# Patient Record
Sex: MALE | Race: WHITE | NOT HISPANIC OR LATINO | URBAN - METROPOLITAN AREA
[De-identification: names, ages, dates, MRNs, and addresses within clinical notes are randomized per-mention and may not be internally consistent; named-entity substitution may affect disease eponyms.]

---

## 2017-03-10 ENCOUNTER — RECORDS - HEALTHEAST (OUTPATIENT)
Dept: LAB | Facility: CLINIC | Age: 57
End: 2017-03-10

## 2017-03-10 LAB
CHOLEST SERPL-MCNC: 142 MG/DL
FASTING STATUS PATIENT QL REPORTED: ABNORMAL
HDLC SERPL-MCNC: 39 MG/DL
LDLC SERPL CALC-MCNC: 91 MG/DL
PSA SERPL-MCNC: 1.7 NG/ML (ref 0–3.5)
TRIGL SERPL-MCNC: 60 MG/DL

## 2018-04-20 ENCOUNTER — RECORDS - HEALTHEAST (OUTPATIENT)
Dept: LAB | Facility: CLINIC | Age: 58
End: 2018-04-20

## 2018-04-20 LAB
ANION GAP SERPL CALCULATED.3IONS-SCNC: 10 MMOL/L (ref 5–18)
BUN SERPL-MCNC: 14 MG/DL (ref 8–22)
CALCIUM SERPL-MCNC: 9.4 MG/DL (ref 8.5–10.5)
CHLORIDE BLD-SCNC: 109 MMOL/L (ref 98–107)
CHOLEST SERPL-MCNC: 146 MG/DL
CO2 SERPL-SCNC: 22 MMOL/L (ref 22–31)
CREAT SERPL-MCNC: 0.83 MG/DL (ref 0.7–1.3)
FASTING STATUS PATIENT QL REPORTED: NORMAL
GFR SERPL CREATININE-BSD FRML MDRD: >60 ML/MIN/1.73M2
GLUCOSE BLD-MCNC: 98 MG/DL (ref 70–125)
HDLC SERPL-MCNC: 40 MG/DL
LDLC SERPL CALC-MCNC: 92 MG/DL
POTASSIUM BLD-SCNC: 3.9 MMOL/L (ref 3.5–5)
PSA SERPL-MCNC: 1.4 NG/ML (ref 0–3.5)
SODIUM SERPL-SCNC: 141 MMOL/L (ref 136–145)
TRIGL SERPL-MCNC: 70 MG/DL

## 2018-10-05 ENCOUNTER — RECORDS - HEALTHEAST (OUTPATIENT)
Dept: LAB | Facility: CLINIC | Age: 58
End: 2018-10-05

## 2018-10-06 LAB — BACTERIA SPEC CULT: NO GROWTH

## 2018-10-22 ENCOUNTER — COMMUNICATION - HEALTHEAST (OUTPATIENT)
Dept: SCHEDULING | Facility: CLINIC | Age: 58
End: 2018-10-22

## 2018-10-22 ENCOUNTER — OFFICE VISIT - HEALTHEAST (OUTPATIENT)
Dept: UROLOGY | Facility: CLINIC | Age: 58
End: 2018-10-22

## 2018-10-22 DIAGNOSIS — N20.9 CALCULUS OF URINARY TRACT: ICD-10-CM

## 2018-10-22 DIAGNOSIS — N20.0 CALCULUS OF KIDNEY: ICD-10-CM

## 2018-10-22 DIAGNOSIS — N20.1 CALCULUS OF URETER: ICD-10-CM

## 2018-10-22 DIAGNOSIS — N13.2 HYDRONEPHROSIS WITH URINARY OBSTRUCTION DUE TO URETERAL CALCULUS: ICD-10-CM

## 2018-10-22 LAB
ALBUMIN UR-MCNC: NEGATIVE MG/DL
APPEARANCE UR: CLEAR
BILIRUB UR QL STRIP: NEGATIVE
COLOR UR AUTO: YELLOW
GLUCOSE UR STRIP-MCNC: NEGATIVE MG/DL
HGB UR QL STRIP: ABNORMAL
KETONES UR STRIP-MCNC: NEGATIVE MG/DL
LEUKOCYTE ESTERASE UR QL STRIP: NEGATIVE
NITRATE UR QL: NEGATIVE
PH UR STRIP: 5.5 [PH] (ref 5–8)
SP GR UR STRIP: >=1.03 (ref 1–1.03)
UROBILINOGEN UR STRIP-ACNC: ABNORMAL

## 2018-10-22 RX ORDER — SIMVASTATIN 40 MG
40 TABLET ORAL AT BEDTIME
Refills: 1 | Status: SHIPPED | COMMUNITY
Start: 2018-10-08

## 2018-10-23 ENCOUNTER — COMMUNICATION - HEALTHEAST (OUTPATIENT)
Dept: UROLOGY | Facility: CLINIC | Age: 58
End: 2018-10-23

## 2018-10-23 ENCOUNTER — AMBULATORY - HEALTHEAST (OUTPATIENT)
Dept: UROLOGY | Facility: CLINIC | Age: 58
End: 2018-10-23

## 2018-10-23 DIAGNOSIS — N20.1 URETERAL STONE: ICD-10-CM

## 2018-10-23 DIAGNOSIS — R10.9 FLANK PAIN: ICD-10-CM

## 2018-10-29 ENCOUNTER — OFFICE VISIT - HEALTHEAST (OUTPATIENT)
Dept: UROLOGY | Facility: CLINIC | Age: 58
End: 2018-10-29

## 2018-10-29 DIAGNOSIS — N20.9 URINARY TRACT STONES: ICD-10-CM

## 2018-10-29 DIAGNOSIS — Q63.1 HORSESHOE KIDNEY WITH RENAL CALCULUS: ICD-10-CM

## 2018-10-29 DIAGNOSIS — N20.1 CALCULUS OF URETER: ICD-10-CM

## 2018-10-29 DIAGNOSIS — N20.0 HORSESHOE KIDNEY WITH RENAL CALCULUS: ICD-10-CM

## 2018-10-29 DIAGNOSIS — N20.0 CALCULUS OF KIDNEY: ICD-10-CM

## 2018-10-29 LAB
ALBUMIN UR-MCNC: NEGATIVE MG/DL
APPEARANCE UR: CLEAR
BILIRUB UR QL STRIP: NEGATIVE
COLOR UR AUTO: YELLOW
GLUCOSE UR STRIP-MCNC: NEGATIVE MG/DL
HGB UR QL STRIP: NEGATIVE
KETONES UR STRIP-MCNC: NEGATIVE MG/DL
LEUKOCYTE ESTERASE UR QL STRIP: NEGATIVE
NITRATE UR QL: NEGATIVE
PH UR STRIP: 7 [PH] (ref 5–8)
SP GR UR STRIP: 1.01 (ref 1–1.03)
UROBILINOGEN UR STRIP-ACNC: NORMAL

## 2019-06-10 ENCOUNTER — RECORDS - HEALTHEAST (OUTPATIENT)
Dept: LAB | Facility: CLINIC | Age: 59
End: 2019-06-10

## 2019-06-10 LAB
ANION GAP SERPL CALCULATED.3IONS-SCNC: 9 MMOL/L (ref 5–18)
BUN SERPL-MCNC: 14 MG/DL (ref 8–22)
CALCIUM SERPL-MCNC: 10.4 MG/DL (ref 8.5–10.5)
CHLORIDE BLD-SCNC: 106 MMOL/L (ref 98–107)
CHOLEST SERPL-MCNC: 191 MG/DL
CO2 SERPL-SCNC: 26 MMOL/L (ref 22–31)
CREAT SERPL-MCNC: 0.88 MG/DL (ref 0.7–1.3)
FASTING STATUS PATIENT QL REPORTED: NORMAL
GFR SERPL CREATININE-BSD FRML MDRD: >60 ML/MIN/1.73M2
GLUCOSE BLD-MCNC: 94 MG/DL (ref 70–125)
HDLC SERPL-MCNC: 45 MG/DL
LDLC SERPL CALC-MCNC: 129 MG/DL
POTASSIUM BLD-SCNC: 4 MMOL/L (ref 3.5–5)
PSA SERPL-MCNC: 1.3 NG/ML (ref 0–3.5)
SODIUM SERPL-SCNC: 141 MMOL/L (ref 136–145)
TRIGL SERPL-MCNC: 83 MG/DL

## 2020-01-28 ENCOUNTER — RECORDS - HEALTHEAST (OUTPATIENT)
Dept: LAB | Facility: CLINIC | Age: 60
End: 2020-01-28

## 2020-01-31 LAB
B BURGDOR AB SER-IMP: NORMAL
LYME AB IGG BAND(S): NORMAL
LYME AB IGM BAND(S): NORMAL
LYME IGG BLOT: NEGATIVE
LYME IGM BLOT: NEGATIVE

## 2020-08-06 ENCOUNTER — RECORDS - HEALTHEAST (OUTPATIENT)
Dept: LAB | Facility: CLINIC | Age: 60
End: 2020-08-06

## 2020-08-06 LAB
ANION GAP SERPL CALCULATED.3IONS-SCNC: 8 MMOL/L (ref 5–18)
BUN SERPL-MCNC: 11 MG/DL (ref 8–22)
CALCIUM SERPL-MCNC: 10.3 MG/DL (ref 8.5–10.5)
CHLORIDE BLD-SCNC: 109 MMOL/L (ref 98–107)
CHOLEST SERPL-MCNC: 151 MG/DL
CO2 SERPL-SCNC: 23 MMOL/L (ref 22–31)
CREAT SERPL-MCNC: 0.85 MG/DL (ref 0.7–1.3)
FASTING STATUS PATIENT QL REPORTED: NORMAL
GFR SERPL CREATININE-BSD FRML MDRD: >60 ML/MIN/1.73M2
GLUCOSE BLD-MCNC: 97 MG/DL (ref 70–125)
HDLC SERPL-MCNC: 42 MG/DL
LDLC SERPL CALC-MCNC: 93 MG/DL
POTASSIUM BLD-SCNC: 4 MMOL/L (ref 3.5–5)
PSA SERPL-MCNC: 1.8 NG/ML (ref 0–4.5)
SODIUM SERPL-SCNC: 140 MMOL/L (ref 136–145)
TRIGL SERPL-MCNC: 81 MG/DL

## 2021-05-25 ENCOUNTER — RECORDS - HEALTHEAST (OUTPATIENT)
Dept: ADMINISTRATIVE | Facility: CLINIC | Age: 61
End: 2021-05-25

## 2021-05-27 ENCOUNTER — RECORDS - HEALTHEAST (OUTPATIENT)
Dept: ADMINISTRATIVE | Facility: CLINIC | Age: 61
End: 2021-05-27

## 2021-06-02 VITALS — WEIGHT: 292 LBS

## 2021-06-16 PROBLEM — N20.0 HORSESHOE KIDNEY WITH RENAL CALCULUS: Status: ACTIVE | Noted: 2018-10-29

## 2021-06-16 PROBLEM — Q63.1 HORSESHOE KIDNEY WITH RENAL CALCULUS: Status: ACTIVE | Noted: 2018-10-29

## 2021-06-16 PROBLEM — N20.0 CALCULUS OF KIDNEY: Status: ACTIVE | Noted: 2018-10-29

## 2021-06-21 NOTE — PROGRESS NOTES
"Assessment/Plan:        Diagnoses and all orders for this visit:    Calculus of ureter    Horseshoe kidney with renal calculus    Calculus of kidney  -     Patient Stated Goal: Prevent further stones  -     Calcium Oxalate Stone Prevention Education    Urinary tract stones  -     Urinalysis Macroscopic    Other orders  -     aspirin 81 MG EC tablet; Take 81 mg by mouth daily.      Stone Management Plan  [facility] Stone Management 10/22/2018 10/29/2018   Urinary Tract Infection No suspicion of infection No suspicion of infection   Renal Colic Well controlled symptoms Asymptomatic at this time   Renal Failure No suspicion of renal failure No suspicion of renal failure   Current CT date 10/21/2018 -   Right sided stones? No -   R Stone Event No current event No current event   Left sided stones? Yes -   L Number of ureteral stones 1 -   L GSD of ureteral stones 3 -   L Location of ureteral stone Distal -   L Number of kidney stones  1 -   L GSD of kidney stones 2 - 4 -   L Hydronephrosis Mild -   L Stone Event New event Resolved event   Diagnosis date 10/21/2018 -   Initial location of primary symptomatic stone Distal -   Initial GSD of primary symptomatic stone 3 -   Resolved date - 10/29/2018   L MET Status Initiation Passed   L Current Plan MET Observe   MET 1 week F/U -   Observe rationale - Limited stone burden with good prognosis for spontaneous passage         Subjective:      HPI  Mr. Eliceo Dotson is a 58 y.o.  male returning to the [facility] [place] for medical expulsive therapy follow up.     On last encounter, his 3 mm stone was in left distal ureter with mild hydronephrosis. He has had to visit the Emergency Department for inadequate pain control.    Symptoms have been minimal since ED discharge. He states the pain progressively improved and he was able to taper of medications after 2 days. He notes intermittent left abdominal \"soreness\" but no further colic. He did not see a a stone " pass and has stopped straining.     He is asymptomatic at present. He denies symptoms of fever, chills, flank pain, nausea, vomiting, urinary frequency and dysuria. He states he has made changes in his diet for stone prevention, including increasing fluids and cutting out spinach and nuts. He plans to continue to enjoy chocolate in moderation.    Imaging was not performed today because stone passage anticipated given initial stone location and size, but no specimen captured for analysis.     PLAN    59 yo M first time stone former with newly diagnosed congenital horseshoe kidney and nephrolithiasis. Resolution of acute left flank pain with suspected passage of left UVJ calculus.     He is congratulated on passing his stone but is aware to call our office if symptoms re-emerge. He was offered prevention workup but has already made dietary adjustments and politely declines at this time. We reviewed conservative dietary recommendations for future stone prevention. He is happy to follow up on a prn basis.     Patient also seen and examined by Maribel Carrillo PA-C       ROS   Review of systems is negative except for HPI.    Past Medical History:   Diagnosis Date     Kidney stone      Medical history non-contributory        Past Surgical History:   Procedure Laterality Date     FIBULA FRACTURE SURGERY Left     Age 13 no suurgery     NO PAST SURGERIES       TIBIA FRACTURE SURGERY Left     Age 13 no surgery       Current Outpatient Prescriptions   Medication Sig Dispense Refill     aspirin 81 MG EC tablet Take 81 mg by mouth daily.       dimenhyDRINATE (DRAMAMINE) 50 MG tablet Take 1 tablet (50 mg total) by mouth every 8 (eight) hours as needed (kidney stone pain, ureteral colic). 28 tablet 0     ondansetron (ZOFRAN ODT) 4 MG disintegrating tablet Take 1 tablet (4 mg total) by mouth every 8 (eight) hours as needed for nausea. 10 tablet 0     simvastatin (ZOCOR) 40 MG tablet Take 40 mg by mouth at bedtime.  1     No current  facility-administered medications for this visit.        No Known Allergies    Social History     Social History     Marital status:      Spouse name: N/A     Number of children: N/A     Years of education: N/A     Occupational History     Not on file.     Social History Main Topics     Smoking status: Former Smoker     Years: 13.00     Smokeless tobacco: Never Used     Alcohol use No     Drug use: Not on file     Sexual activity: Not on file     Other Topics Concern     Not on file     Social History Narrative       Family History   Problem Relation Age of Onset     Heart disease Mother      Prostate cancer Father      Urolithiasis Brother      Objective:      Physical Exam  Vitals:    10/29/18 1535   BP: (!) 161/91   Pulse: 62   Temp: 98  F (36.7  C)     General - well developed, well nourished, appropriate for age. Appears no distress at this time  Abdomen - moderately obese soft, non-tender, no hepatosplenomegaly, no masses.   - no flank tenderness, no suprapubic tenderness, kidney and bladder non-palpable  MSK - normal spinal curvature. no spinal tenderness. normal gait. muscular strength intact.  Psych - oriented to time, place, and person, normal mood and affect.      Labs   Urinalysis POC (Office):  Nitrite, UA   Date Value Ref Range Status   10/29/2018 Negative Negative Final   10/22/2018 Negative Negative Final   10/22/2018 Negative Negative Final       Lab Urinalysis:  Blood, UA   Date Value Ref Range Status   10/29/2018 Negative Negative Final   10/22/2018 Negative Negative Final   10/22/2018 Small (!) Negative Final     Nitrite, UA   Date Value Ref Range Status   10/29/2018 Negative Negative Final   10/22/2018 Negative Negative Final   10/22/2018 Negative Negative Final     Leukocytes, UA   Date Value Ref Range Status   10/29/2018 Negative Negative Final   10/22/2018 Negative Negative Final   10/22/2018 Negative Negative Final     pH, UA   Date Value Ref Range Status   10/29/2018 7.0 5.0 - 8.0  Final   10/22/2018 7.5 4.5 - 8.0 Final   10/22/2018 5.5 5.0 - 8.0 Final

## 2021-06-21 NOTE — PROGRESS NOTES
Assessment/Plan:        Diagnoses and all orders for this visit:    Calculus of ureter  -     Symptom Control While Passing a Stone Education  -     Patient Stated Goal: Pass my stone    Calculus of urinary tract  -     Urinalysis Macroscopic    Calculus of kidney    Hydronephrosis with urinary obstruction due to ureteral calculus    Other orders  -     simvastatin (ZOCOR) 40 MG tablet; Take 40 mg by mouth at bedtime.; Refill: 1  -     ketorolac injection 10 mg (TORADOL); Inject 0.67 mL (10 mg total) into the shoulder, thigh, or buttocks once.  -     ketorolac (TORADOL) 15 mg/mL injection;       Stone Management Plan  [facility] Stone Management 10/22/2018   Urinary Tract Infection No suspicion of infection   Renal Colic Well controlled symptoms   Renal Failure No suspicion of renal failure   Current CT date 10/21/2018   Right sided stones? No   R Stone Event No current event   Left sided stones? Yes   L Number of ureteral stones 1   L GSD of ureteral stones 3   L Location of ureteral stone Distal   L Number of kidney stones  1   L GSD of kidney stones 2 - 4   L Hydronephrosis Mild   L Stone Event New event   Diagnosis date 10/21/2018   Initial location of primary symptomatic stone Distal   Initial GSD of primary symptomatic stone 3   L MET Status Initiation   L Current Plan MET   MET 1 week F/U         Subjective:      HPI  Mr. Eliceo Dotson is a 58 y.o.  male presenting to the [facility] [place] following recent [facility] ED visit for urolithiasis.    He is a first time unidentified composition stone former who has not required stone clearance procedures. He has not previously participated in stone risk evaluation. He has no identified modifiable stone risk factors. He has no identified non-modifiable stone risk factors.    Primary symptom occurring yesterday was acute onset left flank pain x 10 minutes prior to ED arrival. The pain was sharp and constant, worse with bending. He was seen in ED  "last night with CT notable for horseshoe kidney with an obstructing left distal ureteral stone and lower pole stones. He was stable and pain improved with IV medications. He was sent home with oxycodone and zofran. He states acute pain event overnight when he noticed passing a \"capsule-like\" specimen which was not captured. He took oxycodone  He states the pain settled following the event but now re-emerging.    He . He denies symptoms of fever, chills, flank pain, nausea, vomiting, urinary frequency and dysuria.     CT scan from 10/21/18 is personally reviewed and demonstrates horseshoe kidney with an obstructing 3 mm left distal ureteral stone. Additional 2 mm stone at junction of left and right moieties. Radiologist notes diverticulosis without diverticulitis.    Significant labs from presentation include mild hematuria, no pyuria, negative nitrite, no bacteria, normal WBC, normal creatinine and normal potassium.    PLAN    57 yo M first time stone former with newly diagnosed congenital horseshoe kidney and nephrolithiasis. Acute left flank pain with recently diagnosed left UVJ calculus.      Provided in office injection of toradol 10 mg IM once for acute pain.     Will proceed with medical expulsive therapy. Risks and benefits were detailed of medical expulsive therapy including probability of stone passage, recurrent renal colic, and requirement of emergency medical and/or surgical care and further imaging. Patient verbalized understanding. Patient agrees with plan as discussed. He will return in 1 week without low dose CT scan.    For symptom control, he was prescribed oxycodone and ondansetron. Over the counter symptom control medications of ibuprofen, Dramamine and Tylenol were recommended.    Patient also seen and examined by EILEEN Pierce   Review of Systems  A 12 point comprehensive review of systems is negative except for HPI    Past Medical History:   Diagnosis Date     Kidney stone      " Medical history non-contributory        Past Surgical History:   Procedure Laterality Date     FIBULA FRACTURE SURGERY Left     Age 13 no suurgery     NO PAST SURGERIES       TIBIA FRACTURE SURGERY Left     Age 13 no surgery       Current Outpatient Prescriptions   Medication Sig Dispense Refill     acetaminophen (TYLENOL) 500 MG tablet Take 2 tablets (1,000 mg total) by mouth every 6 (six) hours as needed for pain. 56 tablet 0     ibuprofen (ADVIL,MOTRIN) 200 MG tablet Take 3 tablets (600 mg total) by mouth every 6 (six) hours as needed for pain. 56 tablet 0     oxyCODONE (ROXICODONE) 5 MG immediate release tablet Every 4-6 hours as needed if pain is not improved with acetaminophen and ibuprofen. 12 tablet 0     dimenhyDRINATE (DRAMAMINE) 50 MG tablet Take 1 tablet (50 mg total) by mouth every 8 (eight) hours as needed (kidney stone pain, ureteral colic). 28 tablet 0     ondansetron (ZOFRAN ODT) 4 MG disintegrating tablet Take 1 tablet (4 mg total) by mouth every 8 (eight) hours as needed for nausea. 10 tablet 0     simvastatin (ZOCOR) 40 MG tablet Take 40 mg by mouth at bedtime.  1     No current facility-administered medications for this visit.        No Known Allergies    Social History     Social History     Marital status:      Spouse name: N/A     Number of children: N/A     Years of education: N/A     Occupational History     Not on file.     Social History Main Topics     Smoking status: Former Smoker     Years: 13.00     Smokeless tobacco: Not on file     Alcohol use No     Drug use: Not on file     Sexual activity: Not on file     Other Topics Concern     Not on file     Social History Narrative       Family History   Problem Relation Age of Onset     Heart disease Mother      Prostate cancer Father      Urolithiasis Brother        Objective:      Physical Exam  Vitals:    10/22/18 0955   BP: 167/79   Pulse: (!) 57   Temp: 97.6  F (36.4  C)     General - well developed, well nourished, appropriate  for age. Appears mildly uncomfortable   Heart - regular rate and rhythm, no murmur  Respiratory - normal effort, clear to auscultation, good air entry without adventitious noises  Abdomen - mildly obese soft, non-tender, no hepatosplenomegaly, no masses.   - right flank mild tenderness, no suprapubic tenderness, kidney and bladder non-palpable  MSK - normal spinal curvature. no spinal tenderness. normal gait. muscular strength intact.  Neurology - cranial nerves II-XII grossly intact, normal sensation, no unsteadiness  Skin - intact, no bruising, no gouty tophi  Psych - oriented to time, place, and person, normal mood and affect.    Labs  Urinalysis POC (Office):  Nitrite, UA   Date Value Ref Range Status   10/22/2018 Negative Negative Final   10/21/2018 Negative Negative Final       Lab Urinalysis:  Blood, UA   Date Value Ref Range Status   10/22/2018 Small (!) Negative Final   10/21/2018 Trace (!) Negative Final     Nitrite, UA   Date Value Ref Range Status   10/22/2018 Negative Negative Final   10/21/2018 Negative Negative Final     Leukocytes, UA   Date Value Ref Range Status   10/22/2018 Negative Negative Final   10/21/2018 Negative Negative Final     pH, UA   Date Value Ref Range Status   10/22/2018 5.5 5.0 - 8.0 Final   10/21/2018 7.0 4.5 - 8.0 Final    and Acute Labs   CBC   WBC   Date Value Ref Range Status   10/21/2018 8.8 4.0 - 11.0 thou/uL Final     Hemoglobin   Date Value Ref Range Status   10/21/2018 16.3 14.0 - 18.0 g/dL Final     Platelets   Date Value Ref Range Status   10/21/2018 289 140 - 440 thou/uL Final    and Renal Panel  [facility]  Creatinine   Date Value Ref Range Status   10/21/2018 1.22 0.70 - 1.30 mg/dL Final   04/20/2018 0.83 0.70 - 1.30 mg/dL Final   03/10/2017 0.90 0.70 - 1.30 mg/dL Final     Potassium   Date Value Ref Range Status   10/21/2018 3.4 (L) 3.5 - 5.0 mmol/L Final   04/20/2018 3.9 3.5 - 5.0 mmol/L Final   03/10/2017 4.1 3.5 - 5.0 mmol/L Final     Calcium   Date Value Ref Range  Status   10/21/2018 10.6 (H) 8.5 - 10.5 mg/dL Final   04/20/2018 9.4 8.5 - 10.5 mg/dL Final   03/10/2017 9.9 8.5 - 10.5 mg/dL Final

## 2021-06-21 NOTE — PROGRESS NOTES
Patient presents to the office today from emergency room visit follow-up for stone consulation.  Patient was given 10 mg Ketoralac IM on left deltoid with no difficulties per orders.

## 2021-09-27 ENCOUNTER — LAB REQUISITION (OUTPATIENT)
Dept: LAB | Facility: CLINIC | Age: 61
End: 2021-09-27
Payer: COMMERCIAL

## 2021-09-27 DIAGNOSIS — E78.5 HYPERLIPIDEMIA, UNSPECIFIED: ICD-10-CM

## 2021-09-27 DIAGNOSIS — Z13.1 ENCOUNTER FOR SCREENING FOR DIABETES MELLITUS: ICD-10-CM

## 2021-09-27 DIAGNOSIS — Z12.5 ENCOUNTER FOR SCREENING FOR MALIGNANT NEOPLASM OF PROSTATE: ICD-10-CM

## 2021-09-27 LAB
ANION GAP SERPL CALCULATED.3IONS-SCNC: 14 MMOL/L (ref 5–18)
BUN SERPL-MCNC: 15 MG/DL (ref 8–22)
CALCIUM SERPL-MCNC: 10 MG/DL (ref 8.5–10.5)
CHLORIDE BLD-SCNC: 107 MMOL/L (ref 98–107)
CHOLEST SERPL-MCNC: 161 MG/DL
CO2 SERPL-SCNC: 21 MMOL/L (ref 22–31)
CREAT SERPL-MCNC: 0.92 MG/DL (ref 0.7–1.3)
FASTING STATUS PATIENT QL REPORTED: NO
GFR SERPL CREATININE-BSD FRML MDRD: 89 ML/MIN/1.73M2
GLUCOSE BLD-MCNC: 90 MG/DL (ref 70–125)
HDLC SERPL-MCNC: 42 MG/DL
LDLC SERPL CALC-MCNC: 100 MG/DL
POTASSIUM BLD-SCNC: 3.7 MMOL/L (ref 3.5–5)
PSA SERPL-MCNC: 2.12 UG/L (ref 0–4.5)
SODIUM SERPL-SCNC: 142 MMOL/L (ref 136–145)
TRIGL SERPL-MCNC: 95 MG/DL

## 2021-09-27 PROCEDURE — 80061 LIPID PANEL: CPT | Mod: ORL | Performed by: FAMILY MEDICINE

## 2021-09-27 PROCEDURE — 80048 BASIC METABOLIC PNL TOTAL CA: CPT | Mod: ORL | Performed by: FAMILY MEDICINE

## 2021-09-27 PROCEDURE — G0103 PSA SCREENING: HCPCS | Mod: ORL | Performed by: FAMILY MEDICINE

## 2022-09-30 ENCOUNTER — LAB REQUISITION (OUTPATIENT)
Dept: LAB | Facility: CLINIC | Age: 62
End: 2022-09-30
Payer: COMMERCIAL

## 2022-09-30 DIAGNOSIS — Z00.00 ENCOUNTER FOR GENERAL ADULT MEDICAL EXAMINATION WITHOUT ABNORMAL FINDINGS: ICD-10-CM

## 2022-09-30 LAB
ANION GAP SERPL CALCULATED.3IONS-SCNC: 10 MMOL/L (ref 7–15)
BUN SERPL-MCNC: 11.6 MG/DL (ref 8–23)
CALCIUM SERPL-MCNC: 10.3 MG/DL (ref 8.8–10.2)
CHLORIDE SERPL-SCNC: 105 MMOL/L (ref 98–107)
CHOLEST SERPL-MCNC: 148 MG/DL
CREAT SERPL-MCNC: 0.94 MG/DL (ref 0.67–1.17)
DEPRECATED HCO3 PLAS-SCNC: 26 MMOL/L (ref 22–29)
GFR SERPL CREATININE-BSD FRML MDRD: >90 ML/MIN/1.73M2
GLUCOSE SERPL-MCNC: 95 MG/DL (ref 70–99)
HDLC SERPL-MCNC: 40 MG/DL
LDLC SERPL CALC-MCNC: 93 MG/DL
NONHDLC SERPL-MCNC: 108 MG/DL
POTASSIUM SERPL-SCNC: 3.9 MMOL/L (ref 3.4–5.3)
PSA SERPL-MCNC: 2.39 NG/ML (ref 0–4.5)
SODIUM SERPL-SCNC: 141 MMOL/L (ref 136–145)
TRIGL SERPL-MCNC: 75 MG/DL

## 2022-09-30 PROCEDURE — 80061 LIPID PANEL: CPT | Mod: ORL | Performed by: FAMILY MEDICINE

## 2022-09-30 PROCEDURE — 80048 BASIC METABOLIC PNL TOTAL CA: CPT | Mod: ORL | Performed by: FAMILY MEDICINE

## 2022-09-30 PROCEDURE — G0103 PSA SCREENING: HCPCS | Mod: ORL | Performed by: FAMILY MEDICINE

## 2023-10-02 ENCOUNTER — LAB REQUISITION (OUTPATIENT)
Dept: LAB | Facility: CLINIC | Age: 63
End: 2023-10-02

## 2023-10-02 DIAGNOSIS — E78.5 HYPERLIPIDEMIA, UNSPECIFIED: ICD-10-CM

## 2023-10-02 DIAGNOSIS — E55.9 VITAMIN D DEFICIENCY, UNSPECIFIED: ICD-10-CM

## 2023-10-02 DIAGNOSIS — Z12.5 ENCOUNTER FOR SCREENING FOR MALIGNANT NEOPLASM OF PROSTATE: ICD-10-CM

## 2023-10-02 LAB
ANION GAP SERPL CALCULATED.3IONS-SCNC: 11 MMOL/L (ref 7–15)
BUN SERPL-MCNC: 10.6 MG/DL (ref 8–23)
CALCIUM SERPL-MCNC: 10 MG/DL (ref 8.8–10.2)
CHLORIDE SERPL-SCNC: 105 MMOL/L (ref 98–107)
CHOLEST SERPL-MCNC: 157 MG/DL
CREAT SERPL-MCNC: 1.02 MG/DL (ref 0.67–1.17)
DEPRECATED HCO3 PLAS-SCNC: 25 MMOL/L (ref 22–29)
EGFRCR SERPLBLD CKD-EPI 2021: 82 ML/MIN/1.73M2
GLUCOSE SERPL-MCNC: 99 MG/DL (ref 70–99)
HDLC SERPL-MCNC: 40 MG/DL
LDLC SERPL CALC-MCNC: 101 MG/DL
NONHDLC SERPL-MCNC: 117 MG/DL
POTASSIUM SERPL-SCNC: 4 MMOL/L (ref 3.4–5.3)
PSA SERPL DL<=0.01 NG/ML-MCNC: 2.36 NG/ML (ref 0–4.5)
SODIUM SERPL-SCNC: 141 MMOL/L (ref 135–145)
TRIGL SERPL-MCNC: 78 MG/DL
VIT D+METAB SERPL-MCNC: 42 NG/ML (ref 20–50)

## 2023-10-02 PROCEDURE — 80061 LIPID PANEL: CPT | Performed by: FAMILY MEDICINE

## 2023-10-02 PROCEDURE — 80048 BASIC METABOLIC PNL TOTAL CA: CPT | Performed by: FAMILY MEDICINE

## 2023-10-02 PROCEDURE — 82306 VITAMIN D 25 HYDROXY: CPT | Performed by: FAMILY MEDICINE

## 2023-10-02 PROCEDURE — G0103 PSA SCREENING: HCPCS | Performed by: FAMILY MEDICINE
